# Patient Record
Sex: FEMALE | Race: BLACK OR AFRICAN AMERICAN | NOT HISPANIC OR LATINO | Employment: UNEMPLOYED | URBAN - METROPOLITAN AREA
[De-identification: names, ages, dates, MRNs, and addresses within clinical notes are randomized per-mention and may not be internally consistent; named-entity substitution may affect disease eponyms.]

---

## 2018-07-29 ENCOUNTER — HOSPITAL ENCOUNTER (EMERGENCY)
Facility: HOSPITAL | Age: 33
Discharge: LEFT BEFORE DISCHARGE | End: 2018-07-29
Attending: EMERGENCY MEDICINE
Payer: MEDICAID

## 2018-07-29 VITALS
RESPIRATION RATE: 18 BRPM | HEART RATE: 86 BPM | SYSTOLIC BLOOD PRESSURE: 128 MMHG | WEIGHT: 200 LBS | HEIGHT: 69 IN | BODY MASS INDEX: 29.62 KG/M2 | TEMPERATURE: 98 F | OXYGEN SATURATION: 98 % | DIASTOLIC BLOOD PRESSURE: 88 MMHG

## 2018-07-29 DIAGNOSIS — R07.9 CHEST PAIN: ICD-10-CM

## 2018-07-29 DIAGNOSIS — D64.9 ANEMIA, UNSPECIFIED TYPE: Primary | ICD-10-CM

## 2018-07-29 LAB
ALBUMIN SERPL BCP-MCNC: 3.9 G/DL
ALP SERPL-CCNC: 49 U/L
ALT SERPL W/O P-5'-P-CCNC: 26 U/L
ANION GAP SERPL CALC-SCNC: 10 MMOL/L
AST SERPL-CCNC: 42 U/L
BASOPHILS # BLD AUTO: 0.06 K/UL
BASOPHILS NFR BLD: 0.7 %
BILIRUB SERPL-MCNC: 0.3 MG/DL
BNP SERPL-MCNC: <10 PG/ML
BUN SERPL-MCNC: 8 MG/DL
CALCIUM SERPL-MCNC: 9.1 MG/DL
CHLORIDE SERPL-SCNC: 102 MMOL/L
CO2 SERPL-SCNC: 21 MMOL/L
CREAT SERPL-MCNC: 0.7 MG/DL
DIFFERENTIAL METHOD: ABNORMAL
EOSINOPHIL # BLD AUTO: 0.5 K/UL
EOSINOPHIL NFR BLD: 6.5 %
ERYTHROCYTE [DISTWIDTH] IN BLOOD BY AUTOMATED COUNT: 18.4 %
EST. GFR  (AFRICAN AMERICAN): >60 ML/MIN/1.73 M^2
EST. GFR  (NON AFRICAN AMERICAN): >60 ML/MIN/1.73 M^2
GLUCOSE SERPL-MCNC: 88 MG/DL
HCT VFR BLD AUTO: 30.2 %
HGB BLD-MCNC: 8.8 G/DL
IMM GRANULOCYTES # BLD AUTO: 0.02 K/UL
IMM GRANULOCYTES NFR BLD AUTO: 0.2 %
LYMPHOCYTES # BLD AUTO: 3.3 K/UL
LYMPHOCYTES NFR BLD: 40.2 %
MCH RBC QN AUTO: 21.6 PG
MCHC RBC AUTO-ENTMCNC: 29.1 G/DL
MCV RBC AUTO: 74 FL
MONOCYTES # BLD AUTO: 0.7 K/UL
MONOCYTES NFR BLD: 9 %
NEUTROPHILS # BLD AUTO: 3.5 K/UL
NEUTROPHILS NFR BLD: 43.4 %
NRBC BLD-RTO: 0 /100 WBC
PLATELET # BLD AUTO: 307 K/UL
PMV BLD AUTO: 10.3 FL
POTASSIUM SERPL-SCNC: 3.6 MMOL/L
PROT SERPL-MCNC: 8.9 G/DL
RBC # BLD AUTO: 4.07 M/UL
SODIUM SERPL-SCNC: 133 MMOL/L
TROPONIN I SERPL DL<=0.01 NG/ML-MCNC: <0.006 NG/ML
WBC # BLD AUTO: 8.1 K/UL

## 2018-07-29 PROCEDURE — 85025 COMPLETE CBC W/AUTO DIFF WBC: CPT

## 2018-07-29 PROCEDURE — 83880 ASSAY OF NATRIURETIC PEPTIDE: CPT

## 2018-07-29 PROCEDURE — 99284 EMERGENCY DEPT VISIT MOD MDM: CPT | Mod: ,,, | Performed by: EMERGENCY MEDICINE

## 2018-07-29 PROCEDURE — 93005 ELECTROCARDIOGRAM TRACING: CPT

## 2018-07-29 PROCEDURE — 93010 ELECTROCARDIOGRAM REPORT: CPT | Mod: ,,, | Performed by: INTERNAL MEDICINE

## 2018-07-29 PROCEDURE — 84484 ASSAY OF TROPONIN QUANT: CPT

## 2018-07-29 PROCEDURE — 80053 COMPREHEN METABOLIC PANEL: CPT

## 2018-07-29 PROCEDURE — 99285 EMERGENCY DEPT VISIT HI MDM: CPT | Mod: 25

## 2018-07-29 NOTE — Clinical Note
Pt signed AMA form. Education provided on potential risk of leaving prior to all test results. Education provided on potential necessity of staying depending on not yet resulted tests. Verbal feed back given per pt regarding potential risks. Pt alert and  oriented x4 at time of discharge. Discharge instructions and follow up care reviewed with patient.

## 2018-07-30 NOTE — ED TRIAGE NOTES
"Tashia Auguste, a 33 y.o. female presents to the ED \c/o episode of chest pressure and "fluttering" x 10 mins. Pt states "I was at work and I got overheated. I felt like I was going to die. I had pressure in my chest for 10 mins and after it didn't go away I started to panic and became short of breath." Pt denies cardiac hx, reports HTN and compliant with medication. Denies complaints at this time.    Chief Complaint   Patient presents with    Chest Pain     CP and SOB x45 minutes. Became overheated while working, became anxious when CP started. 325 Aspirin given. No cardiac hx, only HTN     Review of patient's allergies indicates:   Allergen Reactions    Penicillins      Past Medical History:   Diagnosis Date    Hypertension         Adult Physical Assessment  LOC: Tashia Auguste, 33 y.o. female verified via two identifiers.  The patient is awake, alert, oriented and speaking appropriately at this time.  APPEARANCE: Patient resting comfortably and appears to be in no acute distress at this time. Patient is clean and well groomed, patient's clothing is properly fastened.  SKIN:The skin is warm and dry, color consistent with ethnicity, patient has normal skin turgor and moist mucus membranes, skin intact, no breakdown or brusing noted.  MUSCULOSKELETAL: Patient moving all extremities well, no obvious swelling or deformities noted.  RESPIRATORY: Airway is open and patent, respirations are spontaneous, patient has a normal effort and rate, no accessory muscle use noted.  CARDIAC: Patient has a normal rate and rhythm, no periphreal edema noted in any extremity, capillary refill < 3 seconds in all extremities  ABDOMEN: Soft and non tender to palpation, no abdominal distention noted. Bowel sounds present in all four quadrants.  NEUROLOGIC: Eyes open spontaneously, behavior appropriate to situation, follows commands, facial expression symmetrical, bilateral hand grasp equal and even, purposeful motor response noted, normal " sensation in all extremities when touched with a finger.

## 2018-07-30 NOTE — ED NOTES
MD Carrie Physician at bedside. Pt signing AMA form. Education provided on potential risk of leaving prior to all test results. Education provided on potential necessity of staying depending on not yet resulted tests. Verbal feed back given per pt regarding potential risks. Pt alert and oriented x4 at time of discharge. Discharge instructions and follow up care reviewed with patient.

## 2018-07-30 NOTE — ED PROVIDER NOTES
Encounter Date: 7/29/2018    SCRIBE #1 NOTE: I, Marine Oakes, am scribing for, and in the presence of,  Dr. Butler. I have scribed the entire note.       History     Chief Complaint   Patient presents with    Chest Pain     CP and SOB x45 minutes. Became overheated while working, became anxious when CP started. 325 Aspirin given. No cardiac hx, only HTN     Time patient was seen by the provider: 8:29 PM      The patient is a 33 y.o. female with co-morbidities including HTN who presents to the ED with a complaint of chest pains. Patient states she was experiencing chest pains that began hurting at work, as well as palpitations, SOB, and then she started panicking after waiting five minutes and the pains would not go away. 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 States the pain is gone now and subsided around 1900. Smokes cigarettes. Reports medication compliance. No familial cardiac history. Denies abdominal pains, problems with bowel movements or urine problems. States she feels better now. Took aspirin today.       The history is provided by the patient and medical records.     Review of patient's allergies indicates:   Allergen Reactions    Penicillins      Past Medical History:   Diagnosis Date    Hypertension      History reviewed. No pertinent surgical history.  History reviewed. No pertinent family history.  Social History   Substance Use Topics    Smoking status: Current Every Day Smoker    Smokeless tobacco: Not on file    Alcohol use Yes      Comment: pint daily     Review of Systems   Constitutional: Negative.    HENT: Negative.    Respiratory: Positive for shortness of breath.    Cardiovascular: Positive for chest pain.   Gastrointestinal: Negative.  Negative for abdominal pain, constipation and diarrhea.   Genitourinary: Negative.    Musculoskeletal: Negative.    Skin: Negative.     Neurological: Negative.    Psychiatric/Behavioral: The patient is nervous/anxious.    All other systems reviewed and are negative.      Physical Exam     Initial Vitals [07/29/18 1949]   BP Pulse Resp Temp SpO2   128/88 86 18 98.4 °F (36.9 °C) 98 %      MAP       --         Physical Exam    Vitals reviewed.  Constitutional: She appears well-developed and well-nourished.   33 year old  woman in no acute distress   HENT:   Head: Normocephalic and atraumatic.   No intraoral lesions or injury noted   Eyes: Pupils are equal, round, and reactive to light.   Neck: Normal range of motion. No tracheal deviation present. No JVD present.   Cardiovascular: Normal rate, regular rhythm, normal heart sounds and intact distal pulses.   Pulmonary/Chest: Breath sounds normal. No respiratory distress.   Abdominal: Soft. She exhibits no distension. There is no tenderness.   obese   Musculoskeletal:   Moves all extremities x 4 with 1+ non pitting bilateral lower extremity edema   Neurological: She is alert and oriented to person, place, and time. She has normal strength.   Skin: Skin is warm and dry.   Psychiatric: She has a normal mood and affect. Thought content normal.         ED Course   Procedures  Labs Reviewed   CBC W/ AUTO DIFFERENTIAL - Abnormal; Notable for the following:        Result Value    Hemoglobin 8.8 (*)     Hematocrit 30.2 (*)     MCV 74 (*)     MCH 21.6 (*)     MCHC 29.1 (*)     RDW 18.4 (*)     All other components within normal limits   COMPREHENSIVE METABOLIC PANEL - Abnormal; Notable for the following:     Sodium 133 (*)     CO2 21 (*)     Total Protein 8.9 (*)     Alkaline Phosphatase 49 (*)     AST 42 (*)     All other components within normal limits   TROPONIN I   B-TYPE NATRIURETIC PEPTIDE     EKG Readings: (Independently Interpreted)   Sinus arhythmia with normal axis and normal ST segments at 80 bpm        Medical Decision Making:   History:   Old Medical Records: I decided to obtain old  medical records.  Differential Diagnosis:   PUD, unstable angina, anxiety disorder, lethal arrhythmia   Independently Interpreted Test(s):   I have ordered and independently interpreted EKG Reading(s) - see prior notes  Clinical Tests:   Lab Tests: Ordered and Reviewed  Medical Tests: Ordered and Reviewed            Scribe Attestation:   Scribe #1: I performed the above scribed service and the documentation accurately describes the services I performed. I attest to the accuracy of the note.    Attending Attestation:             Attending ED Notes:   EKG does not reveal any evidence of acute injury patterns.  The initial set of cardiac enzymes is within normal limits.  Persistent anemia is noted again without history of bleeding, and patient reports known history of anemia secondary to heavy menstrual periods resulting in previous blood transfusions.  Despite my consultation against, the patient has chosen to forego the 2nd set of cardiac enzymes and is requesting discharge to home.  Refusal of care document patient has been completed prior to discharge, and the patient will be discharged to home in stable condition with instructions that she may return at any future time to resume her evaluation for chest pain and return as needed for urgent concerns.             Clinical Impression:   The primary encounter diagnosis was Anemia, unspecified type. A diagnosis of Chest pain was also pertinent to this visit.      Disposition:   Disposition: Discharged  Condition: Stable                        Umesh Butler MD  07/29/18 [id number]

## 2018-07-30 NOTE — ED NOTES
Pt requesting to leave facility and have IV taken out. MD Carrie notified and aware. Will come see pt very shortly.

## 2019-10-19 ENCOUNTER — HOSPITAL ENCOUNTER (EMERGENCY)
Dept: HOSPITAL 92 - ERS | Age: 34
Discharge: HOME | End: 2019-10-19
Payer: SELF-PAY

## 2019-10-19 DIAGNOSIS — F31.9: ICD-10-CM

## 2019-10-19 DIAGNOSIS — Z79.51: ICD-10-CM

## 2019-10-19 DIAGNOSIS — I10: ICD-10-CM

## 2019-10-19 DIAGNOSIS — Z79.899: ICD-10-CM

## 2019-10-19 DIAGNOSIS — F17.210: ICD-10-CM

## 2019-10-19 DIAGNOSIS — F41.9: ICD-10-CM

## 2019-10-19 DIAGNOSIS — J04.0: Primary | ICD-10-CM

## 2019-10-19 PROCEDURE — 87081 CULTURE SCREEN ONLY: CPT

## 2019-10-19 PROCEDURE — 96372 THER/PROPH/DIAG INJ SC/IM: CPT

## 2019-10-19 PROCEDURE — 71045 X-RAY EXAM CHEST 1 VIEW: CPT

## 2019-10-19 PROCEDURE — 87804 INFLUENZA ASSAY W/OPTIC: CPT

## 2019-10-19 PROCEDURE — 87430 STREP A AG IA: CPT

## 2019-10-19 NOTE — RAD
EXAM: Single view of the chest



HISTORY:   Cough



COMPARISON: None



FINDINGS: Single view of the chest shows a normal sized cardiomediastinal silhouette. There is no compa
dence of consolidation, mass, or pleural effusion. The bones are unremarkable.



IMPRESSION: No evidence of acute cardiopulmonary disease



Reported By: Stone De Jesus 

Electronically Signed:  10/19/2019 10:29 PM